# Patient Record
Sex: FEMALE | Race: WHITE | NOT HISPANIC OR LATINO | Employment: OTHER | URBAN - METROPOLITAN AREA
[De-identification: names, ages, dates, MRNs, and addresses within clinical notes are randomized per-mention and may not be internally consistent; named-entity substitution may affect disease eponyms.]

---

## 2018-03-15 ENCOUNTER — NEW PATIENT COMPREHENSIVE (OUTPATIENT)
Dept: URBAN - METROPOLITAN AREA CLINIC 39 | Facility: CLINIC | Age: 73
End: 2018-03-15

## 2018-03-15 DIAGNOSIS — H35.3132: ICD-10-CM

## 2018-03-15 DIAGNOSIS — H25.813: ICD-10-CM

## 2018-03-15 PROCEDURE — 4177F TALK PT/CRGVR RE AREDS PREV: CPT

## 2018-03-15 PROCEDURE — 92015 DETERMINE REFRACTIVE STATE: CPT

## 2018-03-15 PROCEDURE — 2019F DILATED MACUL EXAM DONE: CPT

## 2018-03-15 PROCEDURE — 92134 CPTRZ OPH DX IMG PST SGM RTA: CPT

## 2018-03-15 PROCEDURE — 4040F PNEUMOC VAC/ADMIN/RCVD: CPT

## 2018-03-15 PROCEDURE — G8756 NO BP MEASURE DOC: HCPCS

## 2018-03-15 PROCEDURE — 1036F TOBACCO NON-USER: CPT

## 2018-03-15 PROCEDURE — 99204 OFFICE O/P NEW MOD 45 MIN: CPT

## 2018-03-15 PROCEDURE — G8427 DOCREV CUR MEDS BY ELIG CLIN: HCPCS

## 2018-03-15 ASSESSMENT — VISUAL ACUITY
OD_PH: 20/30-2
OS_SC: J3
OS_SC: 20/200
OD_SC: 20/50-1
OS_PH: 20/50-2
OD_SC: J3

## 2018-03-15 ASSESSMENT — TONOMETRY
OD_IOP_MMHG: 17
OS_IOP_MMHG: 16

## 2018-03-19 ENCOUNTER — CATARACT EVALUATION (OUTPATIENT)
Dept: URBAN - METROPOLITAN AREA CLINIC 39 | Facility: CLINIC | Age: 73
End: 2018-03-19

## 2018-03-19 VITALS
HEART RATE: 86 BPM | RESPIRATION RATE: 18 BRPM | DIASTOLIC BLOOD PRESSURE: 88 MMHG | SYSTOLIC BLOOD PRESSURE: 175 MMHG | HEIGHT: 55 IN

## 2018-03-19 DIAGNOSIS — H25.811: ICD-10-CM

## 2018-03-19 DIAGNOSIS — H04.123: ICD-10-CM

## 2018-03-19 DIAGNOSIS — H35.3132: ICD-10-CM

## 2018-03-19 DIAGNOSIS — H18.51: ICD-10-CM

## 2018-03-19 DIAGNOSIS — H25.812: ICD-10-CM

## 2018-03-19 PROCEDURE — 4177F TALK PT/CRGVR RE AREDS PREV: CPT

## 2018-03-19 PROCEDURE — 99214 OFFICE O/P EST MOD 30 MIN: CPT

## 2018-03-19 PROCEDURE — 92025-1 CORNEAL TOPOGRAPHY, INS

## 2018-03-19 PROCEDURE — G8754 DIAS BP LESS 90: HCPCS

## 2018-03-19 PROCEDURE — 92136TC INTERFEROMETRY - TECHNICAL COMPONENT

## 2018-03-19 PROCEDURE — 2019F DILATED MACUL EXAM DONE: CPT

## 2018-03-19 PROCEDURE — G8753 SYS BP > OR = 140: HCPCS

## 2018-03-19 PROCEDURE — 1036F TOBACCO NON-USER: CPT

## 2018-03-19 PROCEDURE — 4040F PNEUMOC VAC/ADMIN/RCVD: CPT

## 2018-03-19 PROCEDURE — 92286 ANT SGM IMG I&R SPECLR MIC: CPT

## 2018-03-19 PROCEDURE — G8428 CUR MEDS NOT DOCUMENT: HCPCS

## 2018-03-19 RX ORDER — NEPAFENAC 3 MG/ML: 1 SUSPENSION/ DROPS OPHTHALMIC ONCE A DAY

## 2018-03-19 RX ORDER — DUREZOL 0.5 MG/ML: 1 EMULSION OPHTHALMIC TWICE A DAY

## 2018-03-19 RX ORDER — MOXIFLOXACIN HYDROCHLORIDE 5 MG/ML: 1 SOLUTION/ DROPS OPHTHALMIC

## 2018-03-19 ASSESSMENT — VISUAL ACUITY
OD_SC: J3
OS_AM: 20/30
OS_SC: J4
OU_SC: 20/50-1
OS_SC: 20/200
OD_SC: 20/60-1
OD_PAM: 20/30-1

## 2018-03-19 ASSESSMENT — TONOMETRY
OD_IOP_MMHG: 17
OS_IOP_MMHG: 17

## 2018-04-11 ENCOUNTER — SURGERY/PROCEDURE (OUTPATIENT)
Dept: URBAN - METROPOLITAN AREA CLINIC 39 | Facility: CLINIC | Age: 73
End: 2018-04-11

## 2018-04-11 DIAGNOSIS — H25.812: ICD-10-CM

## 2018-04-11 PROCEDURE — 66984 XCAPSL CTRC RMVL W/O ECP: CPT

## 2018-04-12 ENCOUNTER — POST OP/EVAL OF SECOND EYE (OUTPATIENT)
Dept: URBAN - METROPOLITAN AREA CLINIC 39 | Facility: CLINIC | Age: 73
End: 2018-04-12

## 2018-04-12 DIAGNOSIS — H25.811: ICD-10-CM

## 2018-04-12 DIAGNOSIS — Z96.1: ICD-10-CM

## 2018-04-12 PROCEDURE — 99024 POSTOP FOLLOW-UP VISIT: CPT

## 2018-04-12 PROCEDURE — G8756 NO BP MEASURE DOC: HCPCS

## 2018-04-12 PROCEDURE — 99213 OFFICE O/P EST LOW 20 MIN: CPT

## 2018-04-12 PROCEDURE — 4040F PNEUMOC VAC/ADMIN/RCVD: CPT

## 2018-04-12 PROCEDURE — G8427 DOCREV CUR MEDS BY ELIG CLIN: HCPCS

## 2018-04-12 PROCEDURE — 1036F TOBACCO NON-USER: CPT

## 2018-04-12 ASSESSMENT — VISUAL ACUITY
OD_SC: 20/60
OS_SC: 20/25

## 2018-04-12 ASSESSMENT — TONOMETRY
OS_IOP_MMHG: 20
OD_IOP_MMHG: 17

## 2018-04-18 ENCOUNTER — TECH ONLY (OUTPATIENT)
Dept: URBAN - METROPOLITAN AREA CLINIC 39 | Facility: CLINIC | Age: 73
End: 2018-04-18

## 2018-04-18 ENCOUNTER — SURGERY/PROCEDURE (OUTPATIENT)
Dept: URBAN - METROPOLITAN AREA CLINIC 39 | Facility: CLINIC | Age: 73
End: 2018-04-18

## 2018-04-18 DIAGNOSIS — Z96.1: ICD-10-CM

## 2018-04-18 DIAGNOSIS — H25.811: ICD-10-CM

## 2018-04-18 PROCEDURE — G8756 NO BP MEASURE DOC: HCPCS

## 2018-04-18 PROCEDURE — 99211T TECH SERVICE

## 2018-04-18 PROCEDURE — 1036F TOBACCO NON-USER: CPT

## 2018-04-18 PROCEDURE — G9744 PT NOT ELI D/T ACT DIG HTN: HCPCS

## 2018-04-18 PROCEDURE — 4040F PNEUMOC VAC/ADMIN/RCVD: CPT

## 2018-04-18 PROCEDURE — G8428 CUR MEDS NOT DOCUMENT: HCPCS

## 2018-04-18 PROCEDURE — 66984 XCAPSL CTRC RMVL W/O ECP: CPT

## 2018-04-18 ASSESSMENT — VISUAL ACUITY
OS_SC: 20/25+2
OD_SC: J3
OD_SC: 20/60
OS_SC: J12

## 2018-04-18 ASSESSMENT — TONOMETRY
OD_IOP_MMHG: 20
OS_IOP_MMHG: 21

## 2018-04-19 ENCOUNTER — POST-OP (OUTPATIENT)
Dept: URBAN - METROPOLITAN AREA CLINIC 39 | Facility: CLINIC | Age: 73
End: 2018-04-19

## 2018-04-19 DIAGNOSIS — Z96.1: ICD-10-CM

## 2018-04-19 PROCEDURE — 99024 POSTOP FOLLOW-UP VISIT: CPT

## 2018-04-19 ASSESSMENT — VISUAL ACUITY
OS_SC: J12
OS_SC: 20/25-1
OD_SC: J12
OD_SC: 20/30-2

## 2018-04-19 ASSESSMENT — TONOMETRY
OS_IOP_MMHG: 18
OD_IOP_MMHG: 18

## 2018-05-03 ENCOUNTER — POST-OP (OUTPATIENT)
Dept: URBAN - METROPOLITAN AREA CLINIC 39 | Facility: CLINIC | Age: 73
End: 2018-05-03

## 2018-05-03 DIAGNOSIS — Z96.1: ICD-10-CM

## 2018-05-03 DIAGNOSIS — H35.3132: ICD-10-CM

## 2018-05-03 PROCEDURE — 99024 POSTOP FOLLOW-UP VISIT: CPT

## 2018-05-03 ASSESSMENT — TONOMETRY
OS_IOP_MMHG: 18
OD_IOP_MMHG: 18

## 2018-05-03 ASSESSMENT — VISUAL ACUITY
OD_SC: 20/30-2
OD_SC: J12
OS_SC: 20/25
OS_SC: J12

## 2020-01-23 ENCOUNTER — ESTABLISHED COMPREHENSIVE EXAM (OUTPATIENT)
Dept: URBAN - METROPOLITAN AREA CLINIC 39 | Facility: CLINIC | Age: 75
End: 2020-01-23

## 2020-01-23 DIAGNOSIS — H35.3132: ICD-10-CM

## 2020-01-23 DIAGNOSIS — H52.13: ICD-10-CM

## 2020-01-23 DIAGNOSIS — H04.123: ICD-10-CM

## 2020-01-23 PROCEDURE — 92015 DETERMINE REFRACTIVE STATE: CPT

## 2020-01-23 PROCEDURE — 92014 COMPRE OPH EXAM EST PT 1/>: CPT

## 2020-01-23 ASSESSMENT — VISUAL ACUITY
OS_CC: J1
OS_SC: J12
OD_SC: J12
OD_CC: J1
OD_SC: 20/40+1
OS_SC: 20/30+2

## 2020-01-23 ASSESSMENT — TONOMETRY
OS_IOP_MMHG: 18
OD_IOP_MMHG: 18

## 2022-10-27 ENCOUNTER — COMPREHENSIVE EXAM (OUTPATIENT)
Dept: URBAN - METROPOLITAN AREA CLINIC 38 | Facility: CLINIC | Age: 77
End: 2022-10-27

## 2022-10-27 DIAGNOSIS — H35.3132: ICD-10-CM

## 2022-10-27 DIAGNOSIS — H18.519: ICD-10-CM

## 2022-10-27 DIAGNOSIS — H04.123: ICD-10-CM

## 2022-10-27 DIAGNOSIS — H52.02: ICD-10-CM

## 2022-10-27 DIAGNOSIS — H52.203: ICD-10-CM

## 2022-10-27 DIAGNOSIS — H52.4: ICD-10-CM

## 2022-10-27 PROCEDURE — 92015 DETERMINE REFRACTIVE STATE: CPT

## 2022-10-27 PROCEDURE — 92014 COMPRE OPH EXAM EST PT 1/>: CPT

## 2022-10-27 PROCEDURE — 92134 CPTRZ OPH DX IMG PST SGM RTA: CPT

## 2022-10-27 ASSESSMENT — VISUAL ACUITY
OS_CC: J3
OS_SC: 20/30
OD_CC: J2
OD_SC: 20/25

## 2022-10-27 ASSESSMENT — TONOMETRY
OD_IOP_MMHG: 18
OS_IOP_MMHG: 18

## 2024-10-16 ENCOUNTER — COMPREHENSIVE EXAM (OUTPATIENT)
Dept: URBAN - METROPOLITAN AREA CLINIC 38 | Facility: CLINIC | Age: 79
End: 2024-10-16

## 2024-10-16 DIAGNOSIS — H35.3132: ICD-10-CM

## 2024-10-16 DIAGNOSIS — H52.02: ICD-10-CM

## 2024-10-16 DIAGNOSIS — H18.519: ICD-10-CM

## 2024-10-16 DIAGNOSIS — H04.123: ICD-10-CM

## 2024-10-16 DIAGNOSIS — H52.4: ICD-10-CM

## 2024-10-16 DIAGNOSIS — H52.203: ICD-10-CM

## 2024-10-16 PROCEDURE — 92014 COMPRE OPH EXAM EST PT 1/>: CPT

## 2024-10-16 PROCEDURE — 92015 DETERMINE REFRACTIVE STATE: CPT
